# Patient Record
Sex: MALE | Race: ASIAN | Employment: FULL TIME | URBAN - METROPOLITAN AREA
[De-identification: names, ages, dates, MRNs, and addresses within clinical notes are randomized per-mention and may not be internally consistent; named-entity substitution may affect disease eponyms.]

---

## 2024-10-30 ENCOUNTER — OFFICE VISIT (OUTPATIENT)
Age: 29
End: 2024-10-30

## 2024-10-30 VITALS
OXYGEN SATURATION: 98 % | RESPIRATION RATE: 16 BRPM | TEMPERATURE: 98.5 F | SYSTOLIC BLOOD PRESSURE: 90 MMHG | HEART RATE: 72 BPM | DIASTOLIC BLOOD PRESSURE: 65 MMHG

## 2024-10-30 DIAGNOSIS — R09.82 POST-NASAL DRIP: Primary | ICD-10-CM

## 2024-10-30 RX ORDER — MAGNESIUM 30 MG
30 TABLET ORAL DAILY
COMMUNITY

## 2024-10-30 RX ORDER — ACETAMINOPHEN 160 MG
2000 TABLET,DISINTEGRATING ORAL DAILY
COMMUNITY

## 2024-10-30 RX ORDER — MULTIVIT WITH MINERALS/LUTEIN
250 TABLET ORAL DAILY
COMMUNITY

## 2024-10-30 ASSESSMENT — ENCOUNTER SYMPTOMS
SORE THROAT: 0
SINUS PRESSURE: 0
SINUS PAIN: 0
VOICE CHANGE: 1
CHEST TIGHTNESS: 0
NAUSEA: 0
COUGH: 0
VOMITING: 0
ABDOMINAL PAIN: 0
DIARRHEA: 0
TROUBLE SWALLOWING: 1
SHORTNESS OF BREATH: 0
RHINORRHEA: 1

## 2024-10-30 NOTE — PROGRESS NOTES
[facility]  ONSITE CLINIC  PROGRESS NOTE    SUBJECTIVE     Martin Rodriguez is a 29 y.o. male seen for:    Chief Complaint    Pharyngitis         Patient presents to [facility] for a sore throat that started two days ago. Patient states this happens every year about this time when the weather changes and he feels the same way. Patient notes some mild nasal congestion but a cough has not started yet. Patient denies fevers, chills, night sweats, or body aches. Patient denies any sick contacts.     Patient has been gargling with salt water and drinking more water since his symptoms started. He took [place] cold and flu (with acetaminophen) last night and slept well. Patient hasn't taken any further doses of ibuprofen or acetaminophen today. Patient hasn't taken any allergy medicine since his symptoms started.    Patient has not seen a primary care provider recently and has no significant medical or surgical history besides seasonal allergies and tonsillectomy when he was 10 years old. Patient does not want to see a primary care provider at this time.      History provided by:  Patient   used: No      Current Outpatient Medications   Medication Sig Dispense Refill    Ascorbic Acid (VITAMIN C) 250 MG tablet Take 1 tablet by mouth daily      vitamin D (VITAMIN D3) 50 MCG (2000 UT) CAPS capsule Take 1 capsule by mouth daily      Omega-3 Fatty Acids (OMEGA-3 CF PO) Take by mouth      magnesium 30 MG tablet Take 1 tablet by mouth daily       No current facility-administered medications for this visit.      No Known Allergies  Past Medical History:   Diagnosis Date    Allergic rhinitis      Past Surgical History:   Procedure Laterality Date    TONSILLECTOMY         Social History     Tobacco Use    Smoking status: Never    Smokeless tobacco: Never   Vaping Use    Vaping status: Never Used   Substance Use Topics    Alcohol use: Not Currently    Drug use: Not Currently

## 2024-11-08 ENCOUNTER — TELEPHONE (OUTPATIENT)
Age: 29
End: 2024-11-08

## 2024-11-08 NOTE — TELEPHONE ENCOUNTER
Called patient to follow up on the sore throat he had been experiencing last week, which was presumed to be from seasonal allergies. Patient states he is feeling much better this week after taking the claritin and gargling.     Advised patient to follow up with the [facility] and [facility] at [phone] if he needs anything else.     Jackie Locke APRN - NP